# Patient Record
Sex: MALE | Race: WHITE | Employment: OTHER | URBAN - METROPOLITAN AREA
[De-identification: names, ages, dates, MRNs, and addresses within clinical notes are randomized per-mention and may not be internally consistent; named-entity substitution may affect disease eponyms.]

---

## 2022-12-31 PROBLEM — E11.40 TYPE 2 DIABETES MELLITUS WITH DIABETIC NEUROPATHY (HCC): Chronic | Status: ACTIVE | Noted: 2022-12-02

## 2022-12-31 PROBLEM — R19.7 DIARRHEA: Status: ACTIVE | Noted: 2022-12-31

## 2022-12-31 PROBLEM — N17.9 ACUTE KIDNEY INJURY SUPERIMPOSED ON CKD (HCC): Status: ACTIVE | Noted: 2022-12-31

## 2022-12-31 PROBLEM — G62.9 NEUROPATHY: Chronic | Status: ACTIVE | Noted: 2022-04-12

## 2022-12-31 PROBLEM — G62.9 NEUROPATHY: Status: ACTIVE | Noted: 2022-04-12

## 2022-12-31 PROBLEM — J98.19 TRAPPED LUNG: Status: ACTIVE | Noted: 2022-11-10

## 2022-12-31 PROBLEM — Z85.528 PERSONAL HISTORY OF RENAL CELL CARCINOMA: Chronic | Status: ACTIVE | Noted: 2019-05-06

## 2022-12-31 PROBLEM — I48.91 ATRIAL FIBRILLATION (HCC): Chronic | Status: ACTIVE | Noted: 2022-09-22

## 2022-12-31 PROBLEM — J90 PLEURAL EFFUSION: Chronic | Status: ACTIVE | Noted: 2022-08-02

## 2022-12-31 PROBLEM — Z90.5 SINGLE KIDNEY: Status: ACTIVE | Noted: 2020-06-23

## 2022-12-31 PROBLEM — S72.431A: Status: ACTIVE | Noted: 2022-12-31

## 2022-12-31 PROBLEM — R06.00 DYSPNEA: Status: ACTIVE | Noted: 2022-12-31

## 2023-01-26 NOTE — PROGRESS NOTES
Patient pre-assessment complete for EZEQUIEL/CVN scheduled for 0930, arrival time 0830. Patient verified using . Patient instructed to bring a list of all home medications on the day of procedure. NPO status reinforced. Patient instructed to HOLD bumex and spironolactone. Patient verbalizes understanding of all instructions & denies any questions at this time.

## 2023-01-27 ENCOUNTER — HOSPITAL ENCOUNTER (OUTPATIENT)
Dept: CARDIAC CATH/INVASIVE PROCEDURES | Age: 73
Discharge: HOME | End: 2023-01-27
Attending: INTERNAL MEDICINE | Admitting: INTERNAL MEDICINE
Payer: MEDICARE

## 2023-01-27 VITALS
DIASTOLIC BLOOD PRESSURE: 92 MMHG | OXYGEN SATURATION: 92 % | RESPIRATION RATE: 19 BRPM | BODY MASS INDEX: 30.75 KG/M2 | HEART RATE: 78 BPM | WEIGHT: 232 LBS | SYSTOLIC BLOOD PRESSURE: 151 MMHG | HEIGHT: 73 IN

## 2023-01-27 DIAGNOSIS — I48.91 ATRIAL FIBRILLATION, UNSPECIFIED TYPE (HCC): ICD-10-CM

## 2023-01-27 LAB
ANION GAP SERPL CALC-SCNC: 6 MMOL/L (ref 2–11)
BUN SERPL-MCNC: 27 MG/DL (ref 8–23)
CALCIUM SERPL-MCNC: 10.3 MG/DL (ref 8.3–10.4)
CHLORIDE SERPL-SCNC: 106 MMOL/L (ref 101–110)
CO2 SERPL-SCNC: 28 MMOL/L (ref 21–32)
CREAT SERPL-MCNC: 1.8 MG/DL (ref 0.8–1.5)
ECHO BSA: 2.33 M2
EKG ATRIAL RATE: 163 BPM
EKG ATRIAL RATE: 83 BPM
EKG DIAGNOSIS: NORMAL
EKG DIAGNOSIS: NORMAL
EKG P AXIS: -5 DEGREES
EKG P-R INTERVAL: 188 MS
EKG Q-T INTERVAL: 392 MS
EKG Q-T INTERVAL: 406 MS
EKG QRS DURATION: 104 MS
EKG QRS DURATION: 96 MS
EKG QTC CALCULATION (BAZETT): 460 MS
EKG QTC CALCULATION (BAZETT): 477 MS
EKG R AXIS: -6 DEGREES
EKG R AXIS: 14 DEGREES
EKG T AXIS: 139 DEGREES
EKG T AXIS: 75 DEGREES
EKG VENTRICULAR RATE: 83 BPM
EKG VENTRICULAR RATE: 83 BPM
ERYTHROCYTE [DISTWIDTH] IN BLOOD BY AUTOMATED COUNT: 14.3 % (ref 11.9–14.6)
GLUCOSE SERPL-MCNC: 160 MG/DL (ref 65–100)
HCT VFR BLD AUTO: 43.3 % (ref 41.1–50.3)
HGB BLD-MCNC: 13.9 G/DL (ref 13.6–17.2)
LV EF: 63 %
LVEF MODALITY: NORMAL
MCH RBC QN AUTO: 30.9 PG (ref 26.1–32.9)
MCHC RBC AUTO-ENTMCNC: 32.1 G/DL (ref 31.4–35)
MCV RBC AUTO: 96.2 FL (ref 82–102)
NRBC # BLD: 0 K/UL (ref 0–0.2)
PLATELET # BLD AUTO: 340 K/UL (ref 150–450)
PMV BLD AUTO: 10.6 FL (ref 9.4–12.3)
POTASSIUM SERPL-SCNC: 4.5 MMOL/L (ref 3.5–5.1)
RBC # BLD AUTO: 4.5 M/UL (ref 4.23–5.6)
SODIUM SERPL-SCNC: 140 MMOL/L (ref 133–143)
WBC # BLD AUTO: 9 K/UL (ref 4.3–11.1)

## 2023-01-27 PROCEDURE — 93320 DOPPLER ECHO COMPLETE: CPT | Performed by: INTERNAL MEDICINE

## 2023-01-27 PROCEDURE — 99152 MOD SED SAME PHYS/QHP 5/>YRS: CPT | Performed by: INTERNAL MEDICINE

## 2023-01-27 PROCEDURE — 93325 DOPPLER ECHO COLOR FLOW MAPG: CPT | Performed by: INTERNAL MEDICINE

## 2023-01-27 PROCEDURE — 6370000000 HC RX 637 (ALT 250 FOR IP): Performed by: INTERNAL MEDICINE

## 2023-01-27 PROCEDURE — 92960 CARDIOVERSION ELECTRIC EXT: CPT | Performed by: INTERNAL MEDICINE

## 2023-01-27 PROCEDURE — 85027 COMPLETE CBC AUTOMATED: CPT

## 2023-01-27 PROCEDURE — 93312 ECHO TRANSESOPHAGEAL: CPT | Performed by: INTERNAL MEDICINE

## 2023-01-27 PROCEDURE — 99152 MOD SED SAME PHYS/QHP 5/>YRS: CPT

## 2023-01-27 PROCEDURE — 93005 ELECTROCARDIOGRAM TRACING: CPT | Performed by: INTERNAL MEDICINE

## 2023-01-27 PROCEDURE — 80048 BASIC METABOLIC PNL TOTAL CA: CPT

## 2023-01-27 PROCEDURE — C8925 2D TEE W OR W/O FOL W/CON,IN: HCPCS

## 2023-01-27 PROCEDURE — 6360000002 HC RX W HCPCS: Performed by: INTERNAL MEDICINE

## 2023-01-27 RX ORDER — MIDAZOLAM HYDROCHLORIDE 2 MG/2ML
INJECTION, SOLUTION INTRAMUSCULAR; INTRAVENOUS
Status: COMPLETED | OUTPATIENT
Start: 2023-01-27 | End: 2023-01-27

## 2023-01-27 RX ORDER — FENTANYL CITRATE 50 UG/ML
INJECTION, SOLUTION INTRAMUSCULAR; INTRAVENOUS
Status: COMPLETED | OUTPATIENT
Start: 2023-01-27 | End: 2023-01-27

## 2023-01-27 RX ORDER — LIDOCAINE HYDROCHLORIDE 20 MG/ML
SOLUTION OROPHARYNGEAL
Status: COMPLETED | OUTPATIENT
Start: 2023-01-27 | End: 2023-01-27

## 2023-01-27 RX ORDER — SODIUM CHLORIDE 9 MG/ML
INJECTION, SOLUTION INTRAVENOUS CONTINUOUS
Status: DISCONTINUED | OUTPATIENT
Start: 2023-01-27 | End: 2023-01-27 | Stop reason: HOSPADM

## 2023-01-27 RX ADMIN — MIDAZOLAM HYDROCHLORIDE 1 MG: 1 INJECTION, SOLUTION INTRAMUSCULAR; INTRAVENOUS at 11:01

## 2023-01-27 RX ADMIN — LIDOCAINE HYDROCHLORIDE 15 ML: 20 SOLUTION ORAL at 10:41

## 2023-01-27 RX ADMIN — MIDAZOLAM HYDROCHLORIDE 1 MG: 1 INJECTION, SOLUTION INTRAMUSCULAR; INTRAVENOUS at 11:02

## 2023-01-27 RX ADMIN — FENTANYL CITRATE 25 MCG: 50 INJECTION, SOLUTION INTRAMUSCULAR; INTRAVENOUS at 11:01

## 2023-01-27 RX ADMIN — FENTANYL CITRATE 50 MCG: 50 INJECTION, SOLUTION INTRAMUSCULAR; INTRAVENOUS at 10:55

## 2023-01-27 RX ADMIN — FENTANYL CITRATE 25 MCG: 50 INJECTION, SOLUTION INTRAMUSCULAR; INTRAVENOUS at 11:02

## 2023-01-27 RX ADMIN — MIDAZOLAM HYDROCHLORIDE 2 MG: 1 INJECTION, SOLUTION INTRAMUSCULAR; INTRAVENOUS at 10:55

## 2023-01-27 NOTE — PROGRESS NOTES
Discharge instructions given per orders, voiced good understanding of throat care, medications & follow up care.  Denies any questions     Pt and family encouraged to call primary care physician due to oxygen level dropping to 70's-80's while on 2L nasal cannula

## 2023-01-27 NOTE — PROGRESS NOTES
Patient received to [address] [place] [address] room # 16  In wheelchair from [facility]. Patient scheduled for EZEQUIEL/CVN today with Dr Aissatou Navas. Procedure reviewed & questions answered, voiced good understanding consent obtained & placed on chart. All medications and medical history reviewed. Will prep patient per orders. Patient & family updated on plan of care. The patient has a fraility score of 5-MILDLY FRAIL, based on use of motorized wheelchair .

## 2023-01-27 NOTE — PROGRESS NOTES
TRANSFER - OUT REPORT:    Verbal report given to CPRU RN(name) on Petar Bobo being transferred to DC(unit) for routine progression of patient care       Report consisted of patient's Situation, Background, Assessment and   Recommendations(SBAR). Information from the following report(s) Nurse Handoff Report was reviewed with the receiving nurse. Opportunity for questions and clarification was provided.       Patient transported with:   Registered Nurse     EZEQUIEL/CVN with tai Weiss lido @ 1041  4 mg Versd  100 mcg Fentanyl  1 shock @ 360- NSR attained

## 2023-01-27 NOTE — DISCHARGE INSTRUCTIONS
AFTER YOU TRANSESOPHAGEAL ECHOCARDIOGRAM    Be sure someone else drives you home. You may feel drowsy for several hours. Patient okay for oral intake at 1245. Do not eat or drink for at least two hours after your procedure. Your throat will be numb and there is a risk you might have difficulty swallowing for a while. Be careful when you do eat or drink for the first time especially with hot fluids since you could easily burn your throat. Call your doctor if:    You are bleeding from your throat or mouth. You have trouble breathing all of a sudden. You have chest pain or any pain that spreads to your neck, jaw, or arms. You have questions or concerns. You have a fever greater than 101°F.    Doctor: Aissatou Navas    Special Instructions:    No driving for 24 hours.

## 2023-03-13 ENCOUNTER — OFFICE VISIT (OUTPATIENT)
Dept: CARDIOLOGY CLINIC | Age: 73
End: 2023-03-13
Payer: MEDICARE

## 2023-03-13 VITALS
SYSTOLIC BLOOD PRESSURE: 104 MMHG | BODY MASS INDEX: 32.07 KG/M2 | DIASTOLIC BLOOD PRESSURE: 72 MMHG | WEIGHT: 242 LBS | HEIGHT: 73 IN | HEART RATE: 83 BPM

## 2023-03-13 DIAGNOSIS — I48.19 PERSISTENT ATRIAL FIBRILLATION (HCC): Primary | ICD-10-CM

## 2023-03-13 PROCEDURE — G8484 FLU IMMUNIZE NO ADMIN: HCPCS | Performed by: INTERNAL MEDICINE

## 2023-03-13 PROCEDURE — 99214 OFFICE O/P EST MOD 30 MIN: CPT | Performed by: INTERNAL MEDICINE

## 2023-03-13 PROCEDURE — 3078F DIAST BP <80 MM HG: CPT | Performed by: INTERNAL MEDICINE

## 2023-03-13 PROCEDURE — 1036F TOBACCO NON-USER: CPT | Performed by: INTERNAL MEDICINE

## 2023-03-13 PROCEDURE — G8427 DOCREV CUR MEDS BY ELIG CLIN: HCPCS | Performed by: INTERNAL MEDICINE

## 2023-03-13 PROCEDURE — 1123F ACP DISCUSS/DSCN MKR DOCD: CPT | Performed by: INTERNAL MEDICINE

## 2023-03-13 PROCEDURE — 93000 ELECTROCARDIOGRAM COMPLETE: CPT | Performed by: INTERNAL MEDICINE

## 2023-03-13 PROCEDURE — G8417 CALC BMI ABV UP PARAM F/U: HCPCS | Performed by: INTERNAL MEDICINE

## 2023-03-13 PROCEDURE — 3074F SYST BP LT 130 MM HG: CPT | Performed by: INTERNAL MEDICINE

## 2023-03-13 PROCEDURE — 3017F COLORECTAL CA SCREEN DOC REV: CPT | Performed by: INTERNAL MEDICINE

## 2023-03-13 RX ORDER — METOPROLOL SUCCINATE 50 MG/1
50 TABLET, EXTENDED RELEASE ORAL DAILY
Qty: 30 TABLET | Refills: 11 | Status: SHIPPED | OUTPATIENT
Start: 2023-03-13

## 2023-03-13 RX ORDER — LISINOPRIL 10 MG/1
10 TABLET ORAL DAILY
Qty: 90 TABLET | Refills: 3 | Status: SHIPPED | OUTPATIENT
Start: 2023-03-13

## 2023-03-13 RX ORDER — BUMETANIDE 1 MG/1
1 TABLET ORAL DAILY
Qty: 90 TABLET | Refills: 3 | Status: SHIPPED | OUTPATIENT
Start: 2023-03-13

## 2023-03-13 ASSESSMENT — ENCOUNTER SYMPTOMS
BACK PAIN: 1
BLOATING: 0
VOMITING: 0
NAUSEA: 0
DOUBLE VISION: 0
BLURRED VISION: 0
HEMOPTYSIS: 0
COUGH: 0
ORTHOPNEA: 0
SHORTNESS OF BREATH: 1
ABDOMINAL PAIN: 0

## 2023-03-13 NOTE — PROGRESS NOTES
[facility] CARDIOLOGY  [address], [address]  PHONE: [phone]    23    NAME:  Henrry Lopez  : 1950  MRN: [id number]         SUBJECTIVE:   Henrry Lopez is a 74 y.o. male seen for a visit regarding the following:     Chief Complaint   Patient presents with    Follow-Up from Hospital     Post EZEQUIEL/afib       HPI:      History of persistent atrial fibrillation [initially seen for evaluation from 2022 for atrial fibrillation with patient self-referral]; other history of left nephrectomy from 2018 for RCC. Appears recurrent pleural effusion status postthoracentesis. Other history of hypertension, diabetes mellitus. Echocardiogram from [facility] from 2022 with stated preserved EF/normal diastolic function/normal RV size and function (stated normal left atrial size). Noted abnormal protein/creatinine ratio 2.7 from 2022; being evaluated by nephrology at [facility]. Creatinine at 1.19 from 2022. Echo from 2022 with preserved EF and mild LVH; normal left atrial size. S/p EZEQUIEL/cardioversion from 2023 which was successful. Noted back in atrial fibrillation on EKG today. States uncertain of duration. Controlled ventricular rates. Appears some issues with low blood pressures and sustained a fall on [place]; currently being weaned off clonidine. Also some issues with diarrhea which resolved around that time but was concerned with Xarelto and stopped Xarelto after a call in about a week later. Improved edema overall. Recent BMP noted from 1/10/2023 with worsening renal function/mild hyperkalemia; home logs with some low BPs. Prior from initial evaluation from 2022 ---states noted in atrial fibrillation incidentally in August while going through a cataract surgery. Appears during echocardiogram from 2022 at [facility], was likely in sinus rhythm based on read (stated normal diastolic function).   Reports recurrent thoracentesis once from 8/2022 and subsequently from 11/2022. Some issues with elevated blood pressures and several changes made by primary care. Has also recently been seen by nephrology and appears Norvasc and hydralazine have been held. Currently noted on Cardizem  mg daily and labetalol 20 mg twice daily. In atrial fibrillation with controlled rates today. Some concerns about diarrhea being contributed by Xarelto and states also noted on Eliquis previously. However, patient also noted on metformin and discussed. Appears currently taking Lasix 80 mg in a.m. and 40 mg in the afternoon. Denies any chest discomfort. Prior spinal stenosis surgery/neuropathy limits ambulation. Some dyspnea on exertion with more than his ADLs. Denies any PND/orthopnea. No CVA/TIA-like symptoms. Atrial fibrillation-not controlled, edema-controlled, hypertension-stable    Past Medical History, Past Surgical History, Family history, Social History, and Medications were all reviewed with the patient today and updated as necessary. Allergies   Allergen Reactions    Sulfamethoxazole-Trimethoprim Itching     Other reaction(s):  Other (comments)  Urinary tract irritation      Apixaban Diarrhea and Nausea And Vomiting     Patient Active Problem List   Diagnosis    Dyspnea    Atrial fibrillation (HCC)    Chronic kidney disease, stage III (moderate) (HCC)    Chronic low back pain    Essential hypertension    Major depressive disorder    Neuropathy    Personal history of renal cell carcinoma    Recurrent pleural effusion on left    Single kidney    Trapped lung    Type 2 diabetes mellitus with diabetic neuropathy (HCC)    Diarrhea    Closed displaced fracture of medial condyle of right femur (HCC)    Acute kidney injury superimposed on CKD ([address]    Acute congestive heart failure ([address]    Hypoxia       Past Medical History:   Diagnosis Date    Diabetes mellitus ([address]     Hyperlipidemia     Pleural effusion     recurrent since 7/2022     Past Surgical History:   Procedure Laterality Date    CHOLECYSTECTOMY      ORTHOPEDIC SURGERY Bilateral      Family History   Problem Relation Age of Onset    Heart Attack Neg Hx     Heart Surgery Neg Hx      Social History     Tobacco Use    Smoking status: Former     Packs/day: 1.00     Years: 10.00     Pack years: 10.00     Types: Cigarettes     Quit date: 1977     Years since quittin.2    Smokeless tobacco: Never   Substance Use Topics    Alcohol use: Not Currently     Current Outpatient Medications   Medication Sig Dispense Refill    lisinopril (PRINIVIL;ZESTRIL) 10 MG tablet Take 1 tablet by mouth daily 90 tablet 3    metoprolol succinate (TOPROL XL) 50 MG extended release tablet Take 1 tablet by mouth daily 30 tablet 11    bumetanide (BUMEX) 1 MG tablet Take 1 tablet by mouth daily 90 tablet 3    hyoscyamine (ANASPAZ;LEVSIN) 125 MCG tablet Take 0.125 mg by mouth 3 times daily as needed      DULoxetine (CYMBALTA) 60 MG extended release capsule Take 60 mg by mouth daily      Multiple Vitamin (MULTIVITAMIN ADULT PO) Take by mouth      rivaroxaban (XARELTO) 20 MG TABS tablet Take 20 mg by mouth      spironolactone (ALDACTONE) 25 MG tablet Take 1 tablet by mouth daily 30 tablet 11    aspirin 81 MG EC tablet Take 81 mg by mouth daily (Patient not taking: Reported on 3/13/2023)      metFORMIN (GLUCOPHAGE) 1000 MG tablet Take 1,000 mg by mouth 2 times daily (Patient not taking: Reported on 3/13/2023)       No current facility-administered medications for this visit. Review of Systems   Constitutional: Negative for chills, decreased appetite, fever, malaise/fatigue and weight gain. HENT:  Negative for nosebleeds. Eyes:  Negative for blurred vision and double vision. Cardiovascular:  Positive for dyspnea on exertion and leg swelling. Negative for chest pain, claudication, orthopnea, palpitations, paroxysmal nocturnal dyspnea and syncope. Respiratory:  Positive for shortness of breath.  Negative for cough and hemoptysis. Endocrine: Negative for cold intolerance and heat intolerance. Hematologic/Lymphatic: Negative for bleeding problem. Skin:  Negative for rash. Musculoskeletal:  Positive for back pain. Negative for joint pain, muscle weakness and myalgias. Gastrointestinal:  Negative for bloating, abdominal pain, nausea and vomiting. Genitourinary:  Negative for dysuria. Neurological:  Positive for paresthesias. Negative for dizziness, light-headedness and weakness. Psychiatric/Behavioral:  Negative for altered mental status. PHYSICAL EXAM:    /72   Pulse 83   Ht 6' 1\" (1.854 m)   Wt 242 lb (109.8 kg)   BMI 31.93 kg/m²      Physical Exam  Constitutional:       Appearance: Normal appearance. HENT:      Head: Normocephalic and atraumatic. Mouth/Throat:      Mouth: Mucous membranes are moist.   Eyes:      Pupils: Pupils are equal, round, and reactive to light. Cardiovascular:      Rate and Rhythm: Normal rate. Rhythm irregular. Pulses: Normal pulses. Pulmonary:      Effort: Pulmonary effort is normal.      Comments: Diminished breath sounds at left base  Abdominal:      General: Bowel sounds are normal. There is no distension. Palpations: Abdomen is soft. Tenderness: There is no abdominal tenderness. Musculoskeletal:         General: Swelling (1+; stasis changes) present. Cervical back: Normal range of motion. Skin:     General: Skin is warm and dry. Neurological:      General: No focal deficit present. Mental Status: He is alert and oriented to person, place, and time. Medical problems and test results were reviewed with the patient today. No results found for this or any previous visit (from the past 672 hour(s)). No results found for: CHOL, CHOLPOCT, CHOLX, CHLST, CHOLV, HDL, HDLPOC, HDLC, LDL, LDLC, VLDLC, VLDL, TGLX, TRIGL    No results found for any visits on 03/13/23.     Deion Vasquez was seen today for follow-up from hospital.    Diagnoses and all orders for this visit:    Persistent atrial fibrillation (HCC)  -     EKG 12 lead  -     EKG 12 lead    Other orders  -     lisinopril (PRINIVIL;ZESTRIL) 10 MG tablet; Take 1 tablet by mouth daily  -     metoprolol succinate (TOPROL XL) 50 MG extended release tablet; Take 1 tablet by mouth daily  -     bumetanide (BUMEX) 1 MG tablet; Take 1 tablet by mouth daily      Overall Impression    Persistent atrial fibrillation  -Successful cardioversion but back in AF on visit today. Wants to pursue rate control at this time and states no significant improvement in symptoms  -Previously appears ongoing since at least August 2022. Possibly with sinus rhythm during echocardiogram from 5/2022. Was not referred to cardiology previously. Discussed decrease in success rates with rhythm control with prolonged episode.  -Noted recurrent pleural effusions which could be contributed by possible nephrotic range proteinuria but cannot rule out contribution of intolerance atrial fibrillation although less likely with no changes in symptoms.  -Stop amiodarone; continue Xarelto. Initiate Toprol-XL 50 mg daily. Pleural effusion  -See above. Recurrent. Stated transudative per records from nephrology.  -Previously was noted on Lasix 80 mg in a.m. and 40 mg in p.m.; held Lasix and started Bumex 1 mg daily. Initiated Aldactone but decreased dose to 12.5 mg daily with some low BPs currently. See above with possible role of nephrotic range proteinuria. Proteinuria  -Noted spot ratio; 24-hour urine with around 1.6 g.  Continue lisinopril. Edema  -Multifactorial in the setting of proteinuria/persistent atrial fibrillation/medication related/stasis changes. Hypertension  -Not controlled. Appears amlodipine/hydralazine held during initial evaluation. See above with decreasing Aldactone. Held Cardizem with plans for rhythm control and discussed could also contribute towards edema. Noted recent worsening renal function likely related to hypotension/possible dehydration component. Decrease lisinopril to 10 mg daily. See above initiating Toprol-XL. Return in about 2 months (around 5/13/2023).      Jarod Pedro MD  3/13/2023  2:20 PM

## 2023-06-01 ENCOUNTER — OFFICE VISIT (OUTPATIENT)
Age: 73
End: 2023-06-01
Payer: MEDICARE

## 2023-06-01 VITALS
BODY MASS INDEX: 31.93 KG/M2 | HEART RATE: 78 BPM | SYSTOLIC BLOOD PRESSURE: 120 MMHG | DIASTOLIC BLOOD PRESSURE: 70 MMHG | HEIGHT: 73 IN

## 2023-06-01 DIAGNOSIS — I10 ESSENTIAL HYPERTENSION: ICD-10-CM

## 2023-06-01 DIAGNOSIS — I48.19 PERSISTENT ATRIAL FIBRILLATION (HCC): Primary | ICD-10-CM

## 2023-06-01 DIAGNOSIS — J90 PLEURAL EFFUSION: ICD-10-CM

## 2023-06-01 PROCEDURE — 1123F ACP DISCUSS/DSCN MKR DOCD: CPT | Performed by: INTERNAL MEDICINE

## 2023-06-01 PROCEDURE — 99214 OFFICE O/P EST MOD 30 MIN: CPT | Performed by: INTERNAL MEDICINE

## 2023-06-01 PROCEDURE — 3017F COLORECTAL CA SCREEN DOC REV: CPT | Performed by: INTERNAL MEDICINE

## 2023-06-01 PROCEDURE — 3078F DIAST BP <80 MM HG: CPT | Performed by: INTERNAL MEDICINE

## 2023-06-01 PROCEDURE — 1036F TOBACCO NON-USER: CPT | Performed by: INTERNAL MEDICINE

## 2023-06-01 PROCEDURE — G8417 CALC BMI ABV UP PARAM F/U: HCPCS | Performed by: INTERNAL MEDICINE

## 2023-06-01 PROCEDURE — G8427 DOCREV CUR MEDS BY ELIG CLIN: HCPCS | Performed by: INTERNAL MEDICINE

## 2023-06-01 PROCEDURE — 3074F SYST BP LT 130 MM HG: CPT | Performed by: INTERNAL MEDICINE

## 2023-06-01 RX ORDER — ONDANSETRON HYDROCHLORIDE 8 MG/1
8 TABLET, FILM COATED ORAL EVERY 8 HOURS PRN
COMMUNITY

## 2023-06-01 ASSESSMENT — ENCOUNTER SYMPTOMS
BLURRED VISION: 0
ABDOMINAL PAIN: 0
COUGH: 0
DOUBLE VISION: 0
BLOATING: 0
HEMOPTYSIS: 0
NAUSEA: 0
BACK PAIN: 1
SHORTNESS OF BREATH: 1
VOMITING: 0
ORTHOPNEA: 0

## 2023-06-01 NOTE — PROGRESS NOTES
congestive heart failure (HCC)    Hypoxia       Past Medical History:   Diagnosis Date    Diabetes mellitus ([facility] [address]     Hyperlipidemia     Pleural effusion     recurrent since 2022     Past Surgical History:   Procedure Laterality Date    CHOLECYSTECTOMY      ORTHOPEDIC SURGERY Bilateral      Family History   Problem Relation Age of Onset    Heart Attack Neg Hx     Heart Surgery Neg Hx      Social History     Tobacco Use    Smoking status: Former     Packs/day: 1.00     Years: 10.00     Pack years: 10.00     Types: Cigarettes     Quit date: 1977     Years since quittin.4    Smokeless tobacco: Never   Substance Use Topics    Alcohol use: Not Currently     Current Outpatient Medications   Medication Sig Dispense Refill    ondansetron (ZOFRAN) 8 MG tablet Take 1 tablet by mouth every 8 hours as needed for Nausea or Vomiting      lisinopril (PRINIVIL;ZESTRIL) 10 MG tablet Take 1 tablet by mouth daily 90 tablet 3    metoprolol succinate (TOPROL XL) 50 MG extended release tablet Take 1 tablet by mouth daily 30 tablet 11    bumetanide (BUMEX) 1 MG tablet Take 1 tablet by mouth daily 90 tablet 3    hyoscyamine (ANASPAZ;LEVSIN) 125 MCG tablet Take 1 tablet by mouth 3 times daily as needed      DULoxetine (CYMBALTA) 60 MG extended release capsule Take 1 capsule by mouth daily      Multiple Vitamin (MULTIVITAMIN ADULT PO) Take by mouth      rivaroxaban (XARELTO) 20 MG TABS tablet Take 1 tablet by mouth      spironolactone (ALDACTONE) 25 MG tablet Take 1 tablet by mouth daily 30 tablet 11    aspirin 81 MG EC tablet Take 81 mg by mouth daily (Patient not taking: Reported on 3/13/2023)      metFORMIN (GLUCOPHAGE) 1000 MG tablet Take 1,000 mg by mouth 2 times daily (Patient not taking: Reported on 3/13/2023)       No current facility-administered medications for this visit. Review of Systems   Constitutional: Negative for chills, decreased appetite, fever, malaise/fatigue and weight gain.    HENT:  Negative for